# Patient Record
(demographics unavailable — no encounter records)

---

## 2024-10-29 NOTE — PHYSICAL EXAM
[Normal] : temporomandibular joint is normal [FreeTextEntry1] : Microscopic ear exam with cerumen debridement:  Right ear: Obstructing cerumen was debrided from the ear canal using suction, and curet.  The ear canal was otherwise within normal limits.  The tympanic membrane was intact and noninflamed.  Postsurgical change with cartilage replacing portions of the tympanic membrane.  Left ear: Obstructing cerumen was debrided from the ear canal using suction, and curets.  The ear canal was otherwise within normal limits.  The tympanic membrane was intact and noninflamed.

## 2024-10-29 NOTE — HISTORY OF PRESENT ILLNESS
[de-identified] :  BRYANT RAMSEY has a history of bilateral hearing loss in the left ear greater than the right since or about 2017 of gradual onset. May 2022: Left tympanoplasty with stapedectomy October 2022: Right tympanoplasty, reduced stapes mobility identified. January 2024: Right laser stapedotomy   [FreeTextEntry1] : 10/29/2024 No reported change in hearing.  Using amplification in the left ear with good benefit.  No tinnitus reported.  No vertigo.  No autophony.

## 2024-10-29 NOTE — ASSESSMENT
[FreeTextEntry1] : Stable bilateral hearing loss with good anatomic closure of the tympanic membrane and air-bone gaps.  Ear hygiene reviewed.  Continued use of amplification recommended.  Annual follow-up recommended and as needed.

## 2024-10-29 NOTE — DATA REVIEWED
[de-identified] : In light of the patients current symptoms, Complete audiometry was ordered and completed today. I have interpreted these results and reviewed them in detail with the patient.  Sloping mixed hearing loss affecting both ears symmetrically.  Speech recognition intact.

## 2025-07-23 NOTE — ASSESSMENT
[FreeTextEntry1] : Worsened hearing in the right ear which appears to be purely conductive in terms of the change.  Possible etiologies were reviewed with the patient in detail.  This may represent prosthesis displacement.  The middle ear is normally aerated which may have lateralized the prosthesis.  Management options carefully reviewed.  Clinical monitoring recommended at this time.  Follow-up recommended in 6 months with repeat audiometry.  Time based billing: This encounter required more than 40 minutes of time excluding procedures.

## 2025-07-23 NOTE — HISTORY OF PRESENT ILLNESS
[de-identified] :  BRYANT RAMSEY has a history of bilateral hearing loss in the left ear greater than the right since or about 2017 of gradual onset. May 2022: Left tympanoplasty with stapedectomy October 2022: Right tympanoplasty, reduced stapes mobility identified. January 2024: Right laser stapedotomy   [FreeTextEntry1] : 7/23/2025 Mild tinnitus reported.  Possible decline in hearing.  No pain.  No otorrhea.  Using amplification in the left ear only.

## 2025-07-23 NOTE — PHYSICAL EXAM
[Normal] : temporomandibular joint is normal [FreeTextEntry1] : Microscopic ear exam with cerumen debridement:  Right ear: Obstructing cerumen was debrided from the ear canal using curet.  The ear canal was otherwise within normal limits.  The tympanic membrane was intact and noninflamed.  Postsurgical change with cartilage replacing portions of the tympanic membrane.  Left ear: Obstructing cerumen was debrided from the ear canal using suction, and curets.  The ear canal was otherwise within normal limits.  The tympanic membrane was intact and noninflamed.  Areas of atrophy without retraction.  [place] Hearing Assessment 512 Hz: Toure test: referred to the right ear Rinne test: 	Right Ear: Air Conduction < Bone Conduction 	Left Ear:   Air Conduction > Bone conduction

## 2025-07-23 NOTE — DATA REVIEWED
[de-identified] : After debridement, in light of the patients current symptoms, Complete audiometry was ordered and completed today. I have interpreted these results and reviewed them in detail with the patient.

## 2025-07-23 NOTE — CONSULT LETTER
[Please see my note below.] : Please see my note below. [FreeTextEntry2] : Dear NAN GRESHAM  [FreeTextEntry1] : Thank you for allowing me to participate in the care of BRYANT RAMSEY . Please see the attached visit note.    [facility] Medical Director of [facility] Department of Otolaryngology [facility]